# Patient Record
Sex: FEMALE
[De-identification: names, ages, dates, MRNs, and addresses within clinical notes are randomized per-mention and may not be internally consistent; named-entity substitution may affect disease eponyms.]

---

## 2017-12-21 PROBLEM — R10.33 PERIUMBILICAL ABDOMINAL PAIN: Status: ACTIVE | Noted: 2017-12-21

## 2018-03-23 ENCOUNTER — LAB SERVICES (OUTPATIENT)
Dept: OTHER | Age: 48
End: 2018-03-23

## 2018-03-23 ENCOUNTER — CHARTING TRANS (OUTPATIENT)
Dept: OTHER | Age: 48
End: 2018-03-23

## 2018-03-23 LAB
FLU A AG RAPID SCREEN: NORMAL
FLU B AG RAPID SCREEN: NORMAL
FLU RAPID SCREEN: NORMAL
SOURCE: NORMAL

## 2018-11-01 VITALS
DIASTOLIC BLOOD PRESSURE: 60 MMHG | SYSTOLIC BLOOD PRESSURE: 120 MMHG | HEART RATE: 83 BPM | TEMPERATURE: 98.6 F | BODY MASS INDEX: 25.69 KG/M2 | OXYGEN SATURATION: 100 % | RESPIRATION RATE: 18 BRPM | WEIGHT: 145 LBS | HEIGHT: 63 IN

## 2025-01-08 ENCOUNTER — HOSPITAL ENCOUNTER (EMERGENCY)
Age: 55
Discharge: HOME | End: 2025-01-08
Attending: EMERGENCY MEDICINE
Payer: MEDICARE

## 2025-01-08 VITALS
DIASTOLIC BLOOD PRESSURE: 91 MMHG | BODY MASS INDEX: 24.16 KG/M2 | HEART RATE: 82 BPM | TEMPERATURE: 98 F | RESPIRATION RATE: 16 BRPM | OXYGEN SATURATION: 100 % | WEIGHT: 145 LBS | SYSTOLIC BLOOD PRESSURE: 163 MMHG | HEIGHT: 65 IN

## 2025-01-08 DIAGNOSIS — U07.1 COVID-19 VIRUS INFECTION: Primary | ICD-10-CM

## 2025-01-08 DIAGNOSIS — S05.02XA ABRASION OF LEFT CORNEA, INITIAL ENCOUNTER: ICD-10-CM

## 2025-01-08 LAB
POCT INFLUENZA A: NEGATIVE
POCT INFLUENZA B: NEGATIVE
SARS-COV-2 RNA RESP QL NAA+PROBE: DETECTED

## 2025-01-08 PROCEDURE — 99283 EMERGENCY DEPT VISIT LOW MDM: CPT

## 2025-01-08 PROCEDURE — 87502 INFLUENZA DNA AMP PROBE: CPT | Performed by: EMERGENCY MEDICINE

## 2025-01-08 RX ORDER — ERYTHROMYCIN 5 MG/G
1 OINTMENT OPHTHALMIC EVERY 6 HOURS
Qty: 1 G | Refills: 0 | Status: SHIPPED | OUTPATIENT
Start: 2025-01-08 | End: 2025-01-15

## 2025-01-08 NOTE — ED INITIAL ASSESSMENT (HPI)
Patient complains of sinus infection for approx. 4 days. Patient also states her eyes are irritated and feel like she has \"carpet burn.\" Patient is legally blind. Patient also reports congestion. No signs of hypoperfusion in triage.

## 2025-01-09 NOTE — ED PROVIDER NOTES
Patient Seen in: [facility] In [place]      History     Chief Complaint   Patient presents with    Cough/URI     Stated Complaint: sinus congestion    Subjective:   HPI      This is a 54-year-old female who presents Emergency Department for evaluation of cough, head congestion and sore throat over the last 4 to 5 days.  Patient denies headache or neck pain, denies chest pain or shortness of breath.  States she does have slight cough but denies feeling short of breath, cough is nonproductive.  Denies abdominal pain.  Patient also states few days ago she woke up and felt some crusting to her left eye, states now she has a \"gritty \"sensation.  Denies any ocular trauma.  Patient reports she is blind and is only able to see light.  Patient does have ophthalmologist who she follows with regularly.    Objective:     Past Medical History:    Arthritis    Blind    Esophageal reflux    Essential hypertension    Hypothyroidism              Past Surgical History:   Procedure Laterality Date    Hernia surgery  2015    UMBILICAL HERNIA                Social History     Socioeconomic History    Marital status:    Tobacco Use    Smoking status: Former     Current packs/day: 0.00     Types: Cigarettes     Quit date:      Years since quittin.0    Smokeless tobacco: Never   Vaping Use    Vaping status: Never Used   Substance and Sexual Activity    Alcohol use: Yes     Comment: Socially    Drug use: Yes     Types: Cannabis                  Physical Exam     ED Triage Vitals [25 1543]   BP (!) 172/99   Pulse 84   Resp 18   Temp 98.1 °F (36.7 °C)   Temp src Temporal   SpO2 100 %   O2 Device None (Room air)       Current Vitals:   Vital Signs  BP: (!) 163/91  Pulse: 82  Resp: 16  Temp: 98.1 °F (36.7 °C)  Temp src: Temporal    Oxygen Therapy  SpO2: 100 %  O2 Device: None (Room air)        Physical Exam  GENERAL: Patient is awake, alert, well-appearing, in no acute distress.  HEENT: No  conjunctival injection.  No ocular discharge.  Slit-lamp exam-anterior chambers are clear.  There is a small corneal abrasion on the left at the 4 o'clock position, negative Lorna sign.  No dendritic lesions.  No scleral icterus.  Mucous membranes are moist, oropharynx is clear, uvula midline.    NECK: Neck is supple, there is no nuchal rigidity.    HEART: Regular rate and rhythm, no murmurs.  LUNGS: Clear to auscultation bilaterally.  No Rales, no rhonchi, no wheezing, no stridor.      ED Course     Labs Reviewed   RAPID SARS-COV-2 BY PCR - Abnormal; Notable for the following components:       Result Value    Rapid SARS-CoV-2 by PCR Detected (*)     All other components within normal limits   POCT FLU TEST - Normal    Narrative:     This assay is a rapid molecular in vitro test utilizing nucleic acid amplification of influenza A and B viral RNA.                   MDM        Differential diagnosis before testing includes but not limited to influenza, COVID, corneal abrasion, conjunctivitis, which is a medical condition that poses a threat to life/function    Past Medical History/comorbidities-as noted in HPI        Diagnostic tests and medications considered but not ordered consider chest x-ray however not performed as examination is not consistent with lower respiratory tract infection    Course of Events during Emergency Room Visit include patient did test positive for COVID, symptoms have been present for 5 days, will not prescribe Paxlovid and patient agrees with plan.  Patient does have a small corneal abrasion will prescribe erythromycin ophthalmic ointment and have patient follow-up with her ophthalmologist.  Return to ER for any change or worsening symptoms.  May alternate ibuprofen or Tylenol as needed.  Stay well-hydrated.  Patient agrees with plan and was discharged in good condition with family ember    Shared decision making was utilized           Discharge  I have discussed with the patient the  results of test, differential diagnosis, treatment plan, warning signs and symptoms which should prompt immediate return.  They expressed understanding of these instructions and agrees to the following plan provided.  They were given written discharge instructions and agrees to return for any concerns and voiced understanding and all questions were answered.    Note to patient: The 21st Century Cures Act makes medical notes like these available to patients in the interest of transparency. However, this is a medical document intended as peer to peer communication. It is written in medical language and may contain abbreviations or verbiage that are unfamiliar. It may appear blunt or direct. Medical documents are intended to carry relevant information, facts as evident, and the clinical opinion of the practitioner.                 Medical Decision Making      Disposition and Plan     Clinical Impression:  1. COVID-19 virus infection    2. Abrasion of left cornea, initial encounter         Disposition:  Discharge  1/8/2025  4:36 pm    Follow-up:  Jayna Mcmillan MD  [address] [address]  [phone]    Follow up in 2 day(s)            Medications Prescribed:  Discharge Medication List as of 1/8/2025  4:42 PM        START taking these medications    Details   erythromycin 5 MG/GM Ophthalmic Ointment Apply 1 Application to eye every 6 (six) hours for 7 days., Normal, Disp-1 g, R-0                 Supplementary Documentation: